# Patient Record
Sex: MALE | Race: WHITE | NOT HISPANIC OR LATINO | Employment: OTHER | URBAN - METROPOLITAN AREA
[De-identification: names, ages, dates, MRNs, and addresses within clinical notes are randomized per-mention and may not be internally consistent; named-entity substitution may affect disease eponyms.]

---

## 2018-04-27 ENCOUNTER — ESTABLISHED COMPREHENSIVE EXAM (OUTPATIENT)
Dept: URBAN - METROPOLITAN AREA CLINIC 43 | Facility: CLINIC | Age: 73
End: 2018-04-27

## 2018-04-27 DIAGNOSIS — Z96.1: ICD-10-CM

## 2018-04-27 DIAGNOSIS — H04.123: ICD-10-CM

## 2018-04-27 PROCEDURE — 1036F TOBACCO NON-USER: CPT

## 2018-04-27 PROCEDURE — G9744 PT NOT ELI D/T ACT DIG HTN: HCPCS

## 2018-04-27 PROCEDURE — G8427 DOCREV CUR MEDS BY ELIG CLIN: HCPCS

## 2018-04-27 PROCEDURE — 92014 COMPRE OPH EXAM EST PT 1/>: CPT

## 2018-04-27 PROCEDURE — 92015 DETERMINE REFRACTIVE STATE: CPT

## 2018-04-27 ASSESSMENT — TONOMETRY
OS_IOP_MMHG: 14
OD_IOP_MMHG: 14

## 2018-04-27 ASSESSMENT — VISUAL ACUITY
OD_SC: 20/25-2
OU_SC: 20/20
OS_SC: J1
OU_SC: J1
OS_SC: 20/30
OD_SC: J1

## 2018-08-27 ENCOUNTER — EST. PATIENT EMERGENCY (OUTPATIENT)
Dept: URBAN - METROPOLITAN AREA CLINIC 43 | Facility: CLINIC | Age: 73
End: 2018-08-27

## 2018-08-27 DIAGNOSIS — H01.006: ICD-10-CM

## 2018-08-27 DIAGNOSIS — H01.003: ICD-10-CM

## 2018-08-27 DIAGNOSIS — H04.123: ICD-10-CM

## 2018-08-27 PROCEDURE — G8756 NO BP MEASURE DOC: HCPCS

## 2018-08-27 PROCEDURE — 1036F TOBACCO NON-USER: CPT

## 2018-08-27 PROCEDURE — G8427 DOCREV CUR MEDS BY ELIG CLIN: HCPCS

## 2018-08-27 PROCEDURE — 92012 INTRM OPH EXAM EST PATIENT: CPT

## 2018-08-27 PROCEDURE — G9903 PT SCRN TBCO ID AS NON USER: HCPCS

## 2018-08-27 RX ORDER — NEOMYCIN SULFATE, POLYMYXIN B SULFATE AND DEXAMETHASONE 3.5; 10000; 1 MG/ML; [USP'U]/ML; MG/ML: 1 SUSPENSION OPHTHALMIC

## 2018-08-27 ASSESSMENT — VISUAL ACUITY
OS_SC: 20/40
OD_SC: 20/30

## 2018-08-27 ASSESSMENT — TONOMETRY
OD_IOP_MMHG: 15
OS_IOP_MMHG: 16

## 2019-06-05 ENCOUNTER — ESTABLISHED COMPREHENSIVE EXAM (OUTPATIENT)
Dept: URBAN - METROPOLITAN AREA CLINIC 43 | Facility: CLINIC | Age: 74
End: 2019-06-05

## 2019-06-05 DIAGNOSIS — H01.003: ICD-10-CM

## 2019-06-05 DIAGNOSIS — H04.123: ICD-10-CM

## 2019-06-05 DIAGNOSIS — Z96.1: ICD-10-CM

## 2019-06-05 DIAGNOSIS — H01.006: ICD-10-CM

## 2019-06-05 PROCEDURE — 92015 DETERMINE REFRACTIVE STATE: CPT

## 2019-06-05 PROCEDURE — 92014 COMPRE OPH EXAM EST PT 1/>: CPT

## 2019-06-05 RX ORDER — NEOMYCIN SULFATE, POLYMYXIN B SULFATE AND DEXAMETHASONE 3.5; 10000; 1 MG/G; [USP'U]/G; MG/G: OINTMENT OPHTHALMIC EVERY EVENING

## 2019-06-05 ASSESSMENT — TONOMETRY
OS_IOP_MMHG: 14
OD_IOP_MMHG: 14

## 2019-06-05 ASSESSMENT — VISUAL ACUITY
OS_SC: J1
OD_SC: 20/25+2
OD_SC: J1
OS_SC: 20/25-1

## 2020-06-09 ENCOUNTER — ESTABLISHED COMPREHENSIVE EXAM (OUTPATIENT)
Dept: URBAN - METROPOLITAN AREA CLINIC 43 | Facility: CLINIC | Age: 75
End: 2020-06-09

## 2020-06-09 DIAGNOSIS — H01.006: ICD-10-CM

## 2020-06-09 DIAGNOSIS — H04.123: ICD-10-CM

## 2020-06-09 DIAGNOSIS — H01.003: ICD-10-CM

## 2020-06-09 DIAGNOSIS — Z96.1: ICD-10-CM

## 2020-06-09 PROCEDURE — 92014 COMPRE OPH EXAM EST PT 1/>: CPT

## 2020-06-09 PROCEDURE — 92015 DETERMINE REFRACTIVE STATE: CPT

## 2020-06-09 ASSESSMENT — VISUAL ACUITY
OS_SC: J1
OS_SC: 20/30-2
OD_SC: J1
OD_SC: 20/30-1

## 2020-06-09 ASSESSMENT — TONOMETRY
OD_IOP_MMHG: 14
OS_IOP_MMHG: 15

## 2020-08-05 ENCOUNTER — EST. PATIENT EMERGENCY (OUTPATIENT)
Dept: URBAN - METROPOLITAN AREA CLINIC 43 | Facility: CLINIC | Age: 75
End: 2020-08-05

## 2020-08-05 DIAGNOSIS — H00.022: ICD-10-CM

## 2020-08-05 PROCEDURE — 99212 OFFICE O/P EST SF 10 MIN: CPT

## 2020-08-05 RX ORDER — DUREZOL 0.5 MG/ML: 1 EMULSION OPHTHALMIC TWICE A DAY

## 2020-08-05 ASSESSMENT — VISUAL ACUITY
OD_SC: 20/25-2
OS_SC: 20/30+1

## 2021-01-01 NOTE — PATIENT DISCUSSION
Advised regular use of [employer]ler grid.
Discussed condition and exacerbating conditions/situations (e.g., dry/arid environments, overhead fans, air conditioners, side effect of medications).
Discussed lid hygiene, warm compress and eyelid wash.
Glasses Prescription given to patient.
Monitor.
Patient given a prescription for glasses with incorporated prism to control diplopia.
Patient understands condition, prognosis and need for follow up care.
Recommended artificial tears to use: 1 drop 4x a day in both eyes.
Reviewed natural history.
Stable. pcp.
9

## 2021-06-08 ENCOUNTER — ESTABLISHED COMPREHENSIVE EXAM (OUTPATIENT)
Dept: URBAN - METROPOLITAN AREA CLINIC 43 | Facility: CLINIC | Age: 76
End: 2021-06-08

## 2021-06-08 DIAGNOSIS — H01.006: ICD-10-CM

## 2021-06-08 DIAGNOSIS — H01.003: ICD-10-CM

## 2021-06-08 DIAGNOSIS — H04.123: ICD-10-CM

## 2021-06-08 PROCEDURE — 92014 COMPRE OPH EXAM EST PT 1/>: CPT

## 2021-06-08 PROCEDURE — 92015 DETERMINE REFRACTIVE STATE: CPT

## 2021-06-08 ASSESSMENT — TONOMETRY
OD_IOP_MMHG: 14
OS_IOP_MMHG: 14

## 2021-06-08 ASSESSMENT — VISUAL ACUITY
OS_SC: J1
OS_SC: 20/30
OD_SC: 20/30
OD_SC: J1

## 2022-05-03 ENCOUNTER — EMERGENCY VISIT (OUTPATIENT)
Dept: URBAN - METROPOLITAN AREA CLINIC 43 | Facility: CLINIC | Age: 77
End: 2022-05-03

## 2022-05-03 DIAGNOSIS — H00.14: ICD-10-CM

## 2022-05-03 DIAGNOSIS — H04.123: ICD-10-CM

## 2022-05-03 PROCEDURE — 92012 INTRM OPH EXAM EST PATIENT: CPT

## 2022-05-03 ASSESSMENT — TONOMETRY
OD_IOP_MMHG: 16
OS_IOP_MMHG: 18

## 2022-05-03 ASSESSMENT — VISUAL ACUITY
OS_SC: 20/60
OD_PH: 20/30-2
OS_PH: 20/30-2
OD_SC: J1
OD_SC: 20/40+2
OS_SC: J2

## 2022-08-09 ENCOUNTER — COMPREHENSIVE EXAM (OUTPATIENT)
Dept: URBAN - METROPOLITAN AREA CLINIC 43 | Facility: CLINIC | Age: 77
End: 2022-08-09

## 2022-08-09 DIAGNOSIS — H04.123: ICD-10-CM

## 2022-08-09 DIAGNOSIS — H01.006: ICD-10-CM

## 2022-08-09 DIAGNOSIS — H01.003: ICD-10-CM

## 2022-08-09 PROCEDURE — 92015 DETERMINE REFRACTIVE STATE: CPT

## 2022-08-09 PROCEDURE — 92014 COMPRE OPH EXAM EST PT 1/>: CPT

## 2022-08-09 ASSESSMENT — VISUAL ACUITY
OD_SC: 20/30+1
OU_SC: 20/30
OS_SC: J1
OD_SC: J1
OS_SC: 20/40+1

## 2022-08-09 ASSESSMENT — TONOMETRY
OS_IOP_MMHG: 16
OD_IOP_MMHG: 18

## 2023-01-20 ENCOUNTER — EMERGENCY VISIT (OUTPATIENT)
Dept: URBAN - METROPOLITAN AREA CLINIC 43 | Facility: CLINIC | Age: 78
End: 2023-01-20

## 2023-01-20 DIAGNOSIS — H00.14: ICD-10-CM

## 2023-01-20 DIAGNOSIS — H04.123: ICD-10-CM

## 2023-01-20 PROCEDURE — 92012 INTRM OPH EXAM EST PATIENT: CPT

## 2023-01-20 RX ORDER — NEOMYCIN SULFATE, POLYMYXIN B SULFATE AND DEXAMETHASONE 3.5; 10000; 1 MG/ML; [USP'U]/ML; MG/ML: 1 SUSPENSION OPHTHALMIC

## 2023-01-20 ASSESSMENT — VISUAL ACUITY
OS_SC: 20/30-1
OD_SC: 20/25

## 2023-04-17 ENCOUNTER — EMERGENCY VISIT (OUTPATIENT)
Dept: URBAN - METROPOLITAN AREA CLINIC 43 | Facility: CLINIC | Age: 78
End: 2023-04-17

## 2023-04-17 DIAGNOSIS — H04.123: ICD-10-CM

## 2023-04-17 DIAGNOSIS — H00.14: ICD-10-CM

## 2023-04-17 PROCEDURE — 92012 INTRM OPH EXAM EST PATIENT: CPT

## 2023-04-17 RX ORDER — AZITHROMYCIN DIHYDRATE 250 MG/1: 1 TABLET, FILM COATED ORAL ONCE A DAY

## 2023-04-17 ASSESSMENT — VISUAL ACUITY
OD_SC: 20/30
OS_SC: 20/40+2

## 2023-04-17 ASSESSMENT — TONOMETRY
OD_IOP_MMHG: 16
OS_IOP_MMHG: 18